# Patient Record
(demographics unavailable — no encounter records)

---

## 2025-04-18 NOTE — HISTORY OF PRESENT ILLNESS
[Sudden] : sudden [8] : 8 [0] : 0 [Dull/Aching] : dull/aching [Sharp] : sharp [Frequent] : frequent [Leisure] : leisure [Sleep] : sleep [Rest] : rest [Meds] : meds [Ice] : ice [Heat] : heat [Physical therapy] : physical therapy [Standing] : standing [Walking] : walking [Stairs] : stairs [de-identified] : 04/17/25: Patient has been experiencing pain in the knee for years. patient states the pain due to arthritis. patient is very active and has pain after activity. [] : no [FreeTextEntry1] : easton Knee [FreeTextEntry9] : Topical Creams

## 2025-04-18 NOTE — DISCUSSION/SUMMARY
[de-identified] : General Dx Discussion The patient was advised of the diagnosis. The natural history of the pathology was explained in full to the patient in layman's terms. All questions were answered. The risks and benefits of surgical and non-surgical treatment alternatives were explained in full to the patient.  Case discussed. Discussed that a TKA would be her best option for long term pain relief.  She does not want to have surgery at this time. Bilateral cortisone injections today tolerated well. Ice as needed. Will start mobic 15mg qd as needed. Patient was given a prescription for an anti-inflammatory medication.  They will take it for the next 5-7 days and then on an as needed basis, as long as there are no medical contra-indications.  Patient is counseled on possible GI and blood pressure side effects. Recommend and request auth for Durolane injections.  Follow up with auth for injections.   Entered by PETER Dale acting as scribe. - The documentation recorded by the scribe accurately reflects the service I personally performed and the decisions made by me.

## 2025-04-18 NOTE — PHYSICAL EXAM
[Right] : right knee [Left] : left knee [5___] : hamstring 5[unfilled]/5 [] : no pain with varus stress [TWNoteComboBox7] : flexion 90 degrees [de-identified] : extension 15 degrees

## 2025-04-18 NOTE — HISTORY OF PRESENT ILLNESS
[Sudden] : sudden [8] : 8 [0] : 0 [Dull/Aching] : dull/aching [Sharp] : sharp [Frequent] : frequent [Leisure] : leisure [Sleep] : sleep [Rest] : rest [Meds] : meds [Ice] : ice [Heat] : heat [Physical therapy] : physical therapy [Standing] : standing [Walking] : walking [Stairs] : stairs [de-identified] : 04/17/25: Patient has been experiencing pain in the knee for years. patient states the pain due to arthritis. patient is very active and has pain after activity. [] : no [FreeTextEntry1] : easton Knee [FreeTextEntry9] : Topical Creams

## 2025-04-18 NOTE — DISCUSSION/SUMMARY
[de-identified] : General Dx Discussion The patient was advised of the diagnosis. The natural history of the pathology was explained in full to the patient in layman's terms. All questions were answered. The risks and benefits of surgical and non-surgical treatment alternatives were explained in full to the patient.  Case discussed. Discussed that a TKA would be her best option for long term pain relief.  She does not want to have surgery at this time. Bilateral cortisone injections today tolerated well. Ice as needed. Will start mobic 15mg qd as needed. Patient was given a prescription for an anti-inflammatory medication.  They will take it for the next 5-7 days and then on an as needed basis, as long as there are no medical contra-indications.  Patient is counseled on possible GI and blood pressure side effects. Recommend and request auth for Durolane injections.  Follow up with auth for injections.   Entered by PETER Dale acting as scribe. - The documentation recorded by the scribe accurately reflects the service I personally performed and the decisions made by me.

## 2025-04-18 NOTE — IMAGING
[Bilateral] : knee bilaterally [All Views] : anteroposterior, lateral, skyline, and anteroposterior standing [advanced tricompartmental OA with medial compartment narrowing and varus alignment] : advanced tricompartmental OA with medial compartment narrowing and varus alignment [FreeTextEntry9] : L>R

## 2025-04-18 NOTE — PHYSICAL EXAM
[Right] : right knee [Left] : left knee [5___] : hamstring 5[unfilled]/5 [] : no pain with varus stress [TWNoteComboBox7] : flexion 90 degrees [de-identified] : extension 15 degrees

## 2025-06-19 NOTE — HISTORY OF PRESENT ILLNESS
[Sudden] : sudden [8] : 8 [0] : 0 [Dull/Aching] : dull/aching [Sharp] : sharp [Frequent] : frequent [Leisure] : leisure [Sleep] : sleep [Rest] : rest [Meds] : meds [Ice] : ice [Heat] : heat [Physical therapy] : physical therapy [Standing] : standing [Walking] : walking [Stairs] : stairs [de-identified] : here for follow up bilateral knees, praneeth authorized and presents today to start. [] : no [FreeTextEntry1] : easton Knee [FreeTextEntry9] : Topical Creams

## 2025-06-19 NOTE — PHYSICAL EXAM
[Right] : right knee [Left] : left knee [5___] : hamstring 5[unfilled]/5 [] : no pain with varus stress [TWNoteComboBox7] : flexion 90 degrees [de-identified] : extension 15 degrees

## 2025-06-19 NOTE — PROCEDURE
[Bilateral] : bilaterally of the [Knee] : knee [Pain] : pain [Inflammation] : inflammation [X-ray evidence of Osteoarthritis on this or prior visit] : x-ray evidence of Osteoarthritis on this or prior visit [Alcohol] : alcohol [Betadine] : betadine [Ethyl Chloride sprayed topically] : ethyl chloride sprayed topically [Durolane (60mg)] : 60mg of Durolane [Call if redness, pain or fever occur] : call if redness, pain or fever occur [Apply ice for 15min out of every hour for the next 12-24 hours as tolerated] : apply ice for 15 minutes out of every hour for the next 12-24 hours as tolerated [Patient was advised to rest the joint(s) for ____ days] : patient was advised to rest the joint(s) for [unfilled] days [Sterile technique used] : sterile technique used

## 2025-06-19 NOTE — DISCUSSION/SUMMARY
[de-identified] : General Dx Discussion The patient was advised of the diagnosis. The natural history of the pathology was explained in full to the patient in layman's terms. All questions were answered. The risks and benefits of surgical and non-surgical treatment alternatives were explained in full to the patient.  Case discussed. Discussed that a TKA would be her best option for long term pain relief.  She does not want to have surgery at this time. Bilateral cortisone injections 4/17/25 Mobic 15 mg qday prn.  Durolane bilateral knees tolerated well. Ice as needed.  Progress Note entered by Jeremy Chávez PA-C working as a scribe for Dr. Dodge. Patient seen by Jeremy Chávez PA-C under the supervision of Dr. Dodge. 
well